# Patient Record
Sex: MALE | Race: WHITE | NOT HISPANIC OR LATINO
[De-identification: names, ages, dates, MRNs, and addresses within clinical notes are randomized per-mention and may not be internally consistent; named-entity substitution may affect disease eponyms.]

---

## 2017-07-26 ENCOUNTER — APPOINTMENT (OUTPATIENT)
Dept: POPULATION HEALTH | Facility: CLINIC | Age: 42
End: 2017-07-26

## 2017-07-26 VITALS
OXYGEN SATURATION: 100 % | DIASTOLIC BLOOD PRESSURE: 84 MMHG | SYSTOLIC BLOOD PRESSURE: 118 MMHG | RESPIRATION RATE: 16 BRPM | BODY MASS INDEX: 19.64 KG/M2 | WEIGHT: 153 LBS | HEIGHT: 74 IN | TEMPERATURE: 97.5 F | HEART RATE: 88 BPM

## 2017-07-26 DIAGNOSIS — Z80.0 FAMILY HISTORY OF MALIGNANT NEOPLASM OF DIGESTIVE ORGANS: ICD-10-CM

## 2017-07-26 DIAGNOSIS — Z87.898 PERSONAL HISTORY OF OTHER SPECIFIED CONDITIONS: ICD-10-CM

## 2017-07-26 DIAGNOSIS — Z87.891 PERSONAL HISTORY OF NICOTINE DEPENDENCE: ICD-10-CM

## 2017-07-26 RX ORDER — INSULIN GLARGINE 100 [IU]/ML
100 INJECTION, SOLUTION SUBCUTANEOUS
Refills: 0 | Status: DISCONTINUED | COMMUNITY

## 2017-08-23 ENCOUNTER — APPOINTMENT (OUTPATIENT)
Dept: POPULATION HEALTH | Facility: CLINIC | Age: 42
End: 2017-08-23
Payer: OTHER MISCELLANEOUS

## 2017-08-23 VITALS
OXYGEN SATURATION: 100 % | HEIGHT: 74 IN | TEMPERATURE: 97.4 F | DIASTOLIC BLOOD PRESSURE: 80 MMHG | WEIGHT: 155 LBS | SYSTOLIC BLOOD PRESSURE: 122 MMHG | BODY MASS INDEX: 19.89 KG/M2 | HEART RATE: 78 BPM | RESPIRATION RATE: 16 BRPM

## 2017-08-23 PROCEDURE — 99213 OFFICE O/P EST LOW 20 MIN: CPT

## 2017-10-04 ENCOUNTER — APPOINTMENT (OUTPATIENT)
Dept: POPULATION HEALTH | Facility: CLINIC | Age: 42
End: 2017-10-04

## 2018-08-27 ENCOUNTER — RX RENEWAL (OUTPATIENT)
Age: 43
End: 2018-08-27

## 2018-09-07 ENCOUNTER — RECORD ABSTRACTING (OUTPATIENT)
Age: 43
End: 2018-09-07

## 2018-09-07 DIAGNOSIS — Z86.39 PERSONAL HISTORY OF OTHER ENDOCRINE, NUTRITIONAL AND METABOLIC DISEASE: ICD-10-CM

## 2018-09-07 DIAGNOSIS — Z52.4 KIDNEY DONOR: ICD-10-CM

## 2018-09-07 DIAGNOSIS — I48.0 PAROXYSMAL ATRIAL FIBRILLATION: ICD-10-CM

## 2018-09-07 LAB
HBA1C MFR BLD HPLC: 7
HBA1C MFR BLD: 7
HBA1C MFR BLD: 7
PODIATRY EVAL: NORMAL

## 2018-09-07 RX ORDER — ASPIRIN 81 MG
81 TABLET, DELAYED RELEASE (ENTERIC COATED) ORAL DAILY
Refills: 0 | Status: ACTIVE | COMMUNITY

## 2018-09-07 RX ORDER — ISOPROPYL ALCOHOL 70 %
TOWELETTE (EA) MISCELLANEOUS
Refills: 0 | Status: ACTIVE | COMMUNITY

## 2018-09-14 ENCOUNTER — APPOINTMENT (OUTPATIENT)
Dept: ENDOCRINOLOGY | Facility: CLINIC | Age: 43
End: 2018-09-14
Payer: COMMERCIAL

## 2018-09-14 VITALS
WEIGHT: 155 LBS | HEIGHT: 74 IN | HEART RATE: 99 BPM | SYSTOLIC BLOOD PRESSURE: 128 MMHG | BODY MASS INDEX: 19.89 KG/M2 | DIASTOLIC BLOOD PRESSURE: 80 MMHG

## 2018-09-14 DIAGNOSIS — G89.29 DORSALGIA, UNSPECIFIED: ICD-10-CM

## 2018-09-14 DIAGNOSIS — M54.9 DORSALGIA, UNSPECIFIED: ICD-10-CM

## 2018-09-14 LAB — GLUCOSE BLDC GLUCOMTR-MCNC: 165

## 2018-09-14 PROCEDURE — 82962 GLUCOSE BLOOD TEST: CPT

## 2018-09-14 PROCEDURE — 99214 OFFICE O/P EST MOD 30 MIN: CPT | Mod: 25

## 2018-09-14 RX ORDER — INSULIN GLARGINE 100 [IU]/ML
100 INJECTION, SOLUTION SUBCUTANEOUS
Refills: 0 | Status: DISCONTINUED | COMMUNITY
End: 2018-09-14

## 2018-09-14 RX ORDER — ASPIRIN 81 MG/1
81 TABLET, COATED ORAL
Refills: 0 | Status: DISCONTINUED | COMMUNITY
End: 2018-09-14

## 2018-09-14 RX ORDER — INSULIN ASPART 100 [IU]/ML
100 INJECTION, SOLUTION INTRAVENOUS; SUBCUTANEOUS
Refills: 0 | Status: DISCONTINUED | COMMUNITY
End: 2018-09-14

## 2018-09-14 RX ORDER — INSULIN GLARGINE 100 [IU]/ML
INJECTION, SOLUTION SUBCUTANEOUS
Refills: 0 | Status: DISCONTINUED | COMMUNITY
End: 2018-09-14

## 2018-09-14 RX ORDER — INSULIN LISPRO 200 [IU]/ML
200 INJECTION, SOLUTION SUBCUTANEOUS
Refills: 0 | Status: DISCONTINUED | COMMUNITY
End: 2018-09-14

## 2018-09-17 ENCOUNTER — MEDICATION RENEWAL (OUTPATIENT)
Age: 43
End: 2018-09-17

## 2018-09-17 RX ORDER — INSULIN ASPART 100 [IU]/ML
100 INJECTION, SOLUTION INTRAVENOUS; SUBCUTANEOUS
Qty: 2 | Refills: 1 | Status: DISCONTINUED | COMMUNITY
Start: 2018-09-14 | End: 2018-09-17

## 2018-10-17 ENCOUNTER — RX RENEWAL (OUTPATIENT)
Age: 43
End: 2018-10-17

## 2018-10-18 ENCOUNTER — APPOINTMENT (OUTPATIENT)
Dept: ENDOCRINOLOGY | Facility: CLINIC | Age: 43
End: 2018-10-18

## 2018-11-15 ENCOUNTER — APPOINTMENT (OUTPATIENT)
Dept: ENDOCRINOLOGY | Facility: CLINIC | Age: 43
End: 2018-11-15
Payer: COMMERCIAL

## 2018-11-15 PROCEDURE — 76536 US EXAM OF HEAD AND NECK: CPT

## 2019-01-15 ENCOUNTER — RECORD ABSTRACTING (OUTPATIENT)
Age: 44
End: 2019-01-15

## 2019-01-23 ENCOUNTER — APPOINTMENT (OUTPATIENT)
Dept: ENDOCRINOLOGY | Facility: CLINIC | Age: 44
End: 2019-01-23
Payer: COMMERCIAL

## 2019-01-23 VITALS
HEIGHT: 74 IN | BODY MASS INDEX: 20.15 KG/M2 | SYSTOLIC BLOOD PRESSURE: 120 MMHG | HEART RATE: 96 BPM | WEIGHT: 157 LBS | DIASTOLIC BLOOD PRESSURE: 80 MMHG

## 2019-01-23 LAB — GLUCOSE BLDC GLUCOMTR-MCNC: 86

## 2019-01-23 PROCEDURE — 82962 GLUCOSE BLOOD TEST: CPT

## 2019-01-23 PROCEDURE — 99214 OFFICE O/P EST MOD 30 MIN: CPT | Mod: 25

## 2019-01-23 RX ORDER — FLASH GLUCOSE SENSOR
KIT MISCELLANEOUS
Qty: 9 | Refills: 1 | Status: DISCONTINUED | COMMUNITY
End: 2019-01-23

## 2019-01-23 RX ORDER — FLASH GLUCOSE SENSOR
KIT MISCELLANEOUS
Qty: 1 | Refills: 0 | Status: DISCONTINUED | COMMUNITY
Start: 2018-09-14 | End: 2019-01-23

## 2019-01-23 RX ORDER — FLASH GLUCOSE SENSOR
KIT MISCELLANEOUS
Qty: 1 | Refills: 0 | Status: DISCONTINUED | COMMUNITY
End: 2019-01-23

## 2019-01-23 NOTE — DATA REVIEWED
[FreeTextEntry1] : Thyroid US  11/15/2018:\par In the right lower pole there is a  hypoechoic nodule. It measures 0.7 x 0.5 x 0.6 cm. (stable)\par In the left lower pole there is a  solid. It measures 0.7 x 0.4 x 0.5 cm. Isoechoic nodule (stable)\par \par \par LABS:\par 1/18/2019:\par Na 131 (stable)\par A1c 7.5%

## 2019-01-23 NOTE — HISTORY OF PRESENT ILLNESS
[FreeTextEntry1] : Patient is seen today for a routine follow up of Type 1 DM, hyponatremia and thyroid nodule.  Has surgery planned for later this week.  Pre-op work up showed hyperglycemia with a Na of 128  (baseline Na level usually around 131).\par \par Quality:  Type 1 DM\par Severity: moderate  (C-peptide still detectable, but low)\par Duration of diabetes:  since 2011\par Associated Complications/ Symptoms:  no known microvascular complications\par Modifying Factors:  Better with insulin\par \par Patient tests blood glucose 3-5 times per day.    Reviewed glucometer and control is improving in the last few weeks with dietary change.   7 day average is 138 mg/dl, 30 day BG average  is 179 mg/dl.    Insurance is not covering Freestyle eunice.  \par \par Current Diabetic Medication Regimen:\par Lantus 12 units qAM\par Humalog with meals using IC ratio of 1:15 and ISF of 1:50.  \par \par Labs are consistent with SIADH.  Hyponatremia has improved somewhat after decrease in beer intake, but has persisted.  CT head was normal in the past.  CXR was unremarkable.  Cortisol and TFTs have been normal.  \par  \par Has injury to right shoulder and biceps tear and will need surgery this Friday.  \par

## 2019-01-23 NOTE — REVIEW OF SYSTEMS
[Fatigue] : fatigue [Back Pain] : back pain [Polydipsia] : polydipsia [Chest Pain] : no chest pain [Shortness Of Breath] : no shortness of breath [Nausea] : no nausea [Headache] : no headaches

## 2019-01-23 NOTE — ASSESSMENT
[FreeTextEntry1] : 43 year old male with Type 1 DM, thyroid nodule and chronic hyponatremia due to SIADH of unclear etiology.    His hyponatremia is stable and his glycemic control is improving.   Safe to proceed with planned surgery at this time from endocrinology standpoint.  \par \par 1.  T1DM-   Continue current insulin doses.  \par 2.  Thyroid nodules-  stable, repeat US in 1-2 years.  \par 2.  Hyponatremia-  stable, continue to increase solute intake and limit free water.

## 2019-01-23 NOTE — CONSULT LETTER
[Dear  ___] : Dear  [unfilled], [Consult Letter:] : I had the pleasure of evaluating your patient, [unfilled]. [Please see my note below.] : Please see my note below. [Consult Closing:] : Thank you very much for allowing me to participate in the care of this patient.  If you have any questions, please do not hesitate to contact me. [Sincerely,] : Sincerely, [FreeTextEntry3] : Xavier Finley MD, FACE\par

## 2019-06-24 ENCOUNTER — APPOINTMENT (OUTPATIENT)
Dept: ENDOCRINOLOGY | Facility: CLINIC | Age: 44
End: 2019-06-24

## 2019-06-24 LAB
GLUCOSE SERPL-MCNC: 126
HBA1C MFR BLD HPLC: 7.5

## 2019-07-24 ENCOUNTER — APPOINTMENT (OUTPATIENT)
Dept: ENDOCRINOLOGY | Facility: CLINIC | Age: 44
End: 2019-07-24
Payer: COMMERCIAL

## 2019-07-24 VITALS
WEIGHT: 151 LBS | DIASTOLIC BLOOD PRESSURE: 70 MMHG | HEART RATE: 95 BPM | SYSTOLIC BLOOD PRESSURE: 110 MMHG | OXYGEN SATURATION: 98 % | BODY MASS INDEX: 19.38 KG/M2 | HEIGHT: 74 IN

## 2019-07-24 LAB — GLUCOSE BLDC GLUCOMTR-MCNC: 119

## 2019-07-24 PROCEDURE — 82962 GLUCOSE BLOOD TEST: CPT

## 2019-07-24 PROCEDURE — 36415 COLL VENOUS BLD VENIPUNCTURE: CPT

## 2019-07-24 PROCEDURE — 99214 OFFICE O/P EST MOD 30 MIN: CPT | Mod: 25

## 2019-07-24 RX ORDER — FLASH GLUCOSE SENSOR
KIT MISCELLANEOUS
Qty: 3 | Refills: 5 | Status: DISCONTINUED | COMMUNITY
Start: 2018-09-14 | End: 2019-07-24

## 2019-07-24 NOTE — PHYSICAL EXAM
[Well Nourished] : well nourished [Well Developed] : well developed [No Acute Distress] : no acute distress [No Neck Mass] : no neck mass was observed [Normal Sclera/Conjunctiva] : normal sclera/conjunctiva [No Proptosis] : no proptosis [No LAD] : no lymphadenopathy [No Thyroid Nodules] : there were no palpable thyroid nodules [Thyroid Not Enlarged] : the thyroid was not enlarged [Normal Rate and Effort] : normal respiratory rhythm and effort [Clear to Auscultation] : lungs were clear to auscultation bilaterally [Normal Rate] : heart rate was normal  [Normal S1, S2] : normal S1 and S2 [Murmurs] : no murmurs [No Edema] : there was no peripheral edema [Regular Rhythm] : with a regular rhythm [Normal Gait] : normal gait [Normal Insight/Judgement] : insight and judgment were intact [Normal Affect] : the affect was normal [Normal Mood] : the mood was normal [Acanthosis Nigricans] : no acanthosis nigricans

## 2019-07-24 NOTE — ASSESSMENT
[FreeTextEntry1] : 43 year old male with Type 1 DM, thyroid nodule and chronic hyponatremia due to SIADH of unclear etiology.    \par \par 1.  T1DM-   Be more consistent with humalog dosing.  Check A1c now.  \par 2.  Thyroid nodules-  stable, repeat US in 1-2 years.  \par 2.  Hyponatremia-   continue to increase solute intake and limit free water.    Check labs now.

## 2019-07-25 LAB
25(OH)D3 SERPL-MCNC: 32.6 NG/ML
ALBUMIN SERPL ELPH-MCNC: 4.9 G/DL
ALP BLD-CCNC: 52 U/L
ALT SERPL-CCNC: 12 U/L
ANION GAP SERPL CALC-SCNC: 15 MMOL/L
AST SERPL-CCNC: 23 U/L
BASOPHILS # BLD AUTO: 0.06 K/UL
BASOPHILS NFR BLD AUTO: 1 %
BILIRUB SERPL-MCNC: 2 MG/DL
BUN SERPL-MCNC: 9 MG/DL
CALCIUM SERPL-MCNC: 9.7 MG/DL
CHLORIDE SERPL-SCNC: 93 MMOL/L
CHOLEST SERPL-MCNC: 179 MG/DL
CHOLEST/HDLC SERPL: 3 RATIO
CO2 SERPL-SCNC: 24 MMOL/L
CREAT SERPL-MCNC: 1.08 MG/DL
CREAT SPEC-SCNC: 44 MG/DL
EOSINOPHIL # BLD AUTO: 0.11 K/UL
EOSINOPHIL NFR BLD AUTO: 1.8 %
ESTIMATED AVERAGE GLUCOSE: 137 MG/DL
GLUCOSE SERPL-MCNC: 127 MG/DL
HBA1C MFR BLD HPLC: 6.4 %
HCT VFR BLD CALC: 42.4 %
HDLC SERPL-MCNC: 60 MG/DL
HGB BLD-MCNC: 14.5 G/DL
IMM GRANULOCYTES NFR BLD AUTO: 0.3 %
LDLC SERPL CALC-MCNC: 108 MG/DL
LYMPHOCYTES # BLD AUTO: 1.63 K/UL
LYMPHOCYTES NFR BLD AUTO: 26.7 %
MAN DIFF?: NORMAL
MCHC RBC-ENTMCNC: 30.5 PG
MCHC RBC-ENTMCNC: 34.2 GM/DL
MCV RBC AUTO: 89.3 FL
MICROALBUMIN 24H UR DL<=1MG/L-MCNC: <1.2 MG/DL
MICROALBUMIN/CREAT 24H UR-RTO: NORMAL MG/G
MONOCYTES # BLD AUTO: 0.71 K/UL
MONOCYTES NFR BLD AUTO: 11.6 %
NEUTROPHILS # BLD AUTO: 3.57 K/UL
NEUTROPHILS NFR BLD AUTO: 58.6 %
PLATELET # BLD AUTO: 328 K/UL
POTASSIUM SERPL-SCNC: 4.6 MMOL/L
PROT SERPL-MCNC: 7.2 G/DL
RBC # BLD: 4.75 M/UL
RBC # FLD: 12.7 %
SODIUM SERPL-SCNC: 132 MMOL/L
TRIGL SERPL-MCNC: 56 MG/DL
TSH SERPL-ACNC: 0.73 UIU/ML
WBC # FLD AUTO: 6.1 K/UL

## 2019-09-05 ENCOUNTER — MEDICATION RENEWAL (OUTPATIENT)
Age: 44
End: 2019-09-05

## 2019-12-27 ENCOUNTER — RX RENEWAL (OUTPATIENT)
Age: 44
End: 2019-12-27

## 2020-01-22 ENCOUNTER — RESULT CHARGE (OUTPATIENT)
Age: 45
End: 2020-01-22

## 2020-01-22 ENCOUNTER — APPOINTMENT (OUTPATIENT)
Dept: ENDOCRINOLOGY | Facility: CLINIC | Age: 45
End: 2020-01-22
Payer: COMMERCIAL

## 2020-01-22 VITALS
HEART RATE: 78 BPM | SYSTOLIC BLOOD PRESSURE: 118 MMHG | BODY MASS INDEX: 20.41 KG/M2 | WEIGHT: 159 LBS | HEIGHT: 74 IN | DIASTOLIC BLOOD PRESSURE: 68 MMHG

## 2020-01-22 LAB — GLUCOSE BLDC GLUCOMTR-MCNC: 137

## 2020-01-22 PROCEDURE — 82962 GLUCOSE BLOOD TEST: CPT

## 2020-01-22 PROCEDURE — 99214 OFFICE O/P EST MOD 30 MIN: CPT | Mod: 25

## 2020-01-22 NOTE — REVIEW OF SYSTEMS
[Neck Pain] : neck pain [Palpitations] : palpitations [Constipation] : constipation [Polyuria] : polyuria [Headache] : headaches [Cold Intolerance] : cold intolerant [Fatigue] : no fatigue [Decreased Appetite] : appetite not decreased [Recent Weight Loss (___ Lbs)] : no recent weight loss [Visual Field Defect] : no visual field defect [Recent Weight Gain (___ Lbs)] : no recent weight gain [Dysphagia] : no dysphagia [Chest Pain] : no chest pain [Blurry Vision] : no blurred vision [Diarrhea] : no diarrhea [Tremors] : no tremors [Dysuria] : no dysuria [Depression] : no depression [Anxiety] : no anxiety [Polydipsia] : no polydipsia [Heat Intolerance] : heat tolerant [Easy Bruising] : no tendency for easy bruising [Swelling] : no swelling [FreeTextEntry4] : r/t injury to disc [FreeTextEntry7] : sometimes  [FreeTextEntry5] : once in awhile, needs to find a new cardiologist  [de-identified] : once in awhile

## 2020-01-22 NOTE — REASON FOR VISIT
[Follow-Up: _____] : a [unfilled] follow-up visit [Other: _____] : [unfilled] [FreeTextEntry1] :  Type 1 DM, hyponatremia and thyroid nodule

## 2020-01-22 NOTE — HISTORY OF PRESENT ILLNESS
[FreeTextEntry1] : Labs have been consistent with SIADH. CT head was normal in the past.  CXR was unremarkable.  Cortisol and TFTs have been normal.  \par \par Quality:  Type 1 DM   (+KIM-65)\par Severity: moderate  (C-peptide still detectable, but low)\par Duration of diabetes:  since 2011\par Associated Complications/ Symptoms: neuropathy \par Modifying Factors:  Better with insulin\par \par Current Diabetic Medication Regimen:\par Lantus 12 units qAM\par Humalog with meals- only takes with high carb meals  (uses IC ratio of 1:15 and ISF of 1:50.  - rarely uses Humalog r/t watching his diet \par \par SMBG:  testing at least 4 times a day. \par per meter - 158, 97, 122, 151, 108, 102, 117, 173, 173, 142, 84, 90\par Insurance did not cover Elena in the past.  \par \par exercise: walks 1 mile a day \par \par Last eye exam: 7/2019 (-) DR \par Last foot exam: in office only \par Last flu vaccine: no

## 2020-01-22 NOTE — ASSESSMENT
[FreeTextEntry1] : 44 year old male with Type 1 DM, thyroid nodule and chronic hyponatremia due to SIADH of unclear etiology.    \par \par Plan:\par Type 1 DM- check A1C - blood sugars are variable \par - continue Lantus and Humalog \par - continue self blood sugar monitoring 4 times a day\par Glucose Sensor Necessity: This patient with diabetes performs 4 or more glucose checks per day utilizing a home blood glucose monitor. The patient is treated with insulin via 3 or more injections daily. This patient requires frequent adjustments to their insulin treatment on the basis of therapeutic continuous glucose monitoring results.\par \par Thyroid nodules-  stable, repeat US in 1-2 years.  repeat in 2020\par \par Hyponatremia-  continue to increase solute intake and limit free water.  Check labs now.  \par \par Labs now and follow up visit in 3 months.

## 2020-01-22 NOTE — PHYSICAL EXAM
[Alert] : alert [Well Developed] : well developed [No Acute Distress] : no acute distress [Well Nourished] : well nourished [Supple] : the neck was supple [EOMI] : extra ocular movement intact [Normal Sclera/Conjunctiva] : normal sclera/conjunctiva [No LAD] : no lymphadenopathy [Thyroid Not Enlarged] : the thyroid was not enlarged [Normal Rate and Effort] : normal respiratory rhythm and effort [No Thyroid Nodules] : there were no palpable thyroid nodules [Normal Rate] : heart rate was normal  [No Accessory Muscle Use] : no accessory muscle use [Clear to Auscultation] : lungs were clear to auscultation bilaterally [Normal S1, S2] : normal S1 and S2 [Regular Rhythm] : with a regular rhythm [Pedal Pulses Normal] : the pedal pulses are present [No Edema] : there was no peripheral edema [Normal Bowel Sounds] : normal bowel sounds [Not Tender] : non-tender [Normal Gait] : normal gait [Soft] : abdomen soft [Acanthosis Nigricans] : no acanthosis nigricans [No Rash] : no rash [Foot Ulcers] : no foot ulcers [Left Foot Was Examined] : left foot ~C was examined [Right Foot Was Examined] : right foot ~C was examined [Normal] : normal [#1 Diminished] : number 1 was diminished [#3 Diminished] : number 3 was diminished [#4 Diminished] : number 4 was diminished [#2 Diminished] : number 2 was diminished [#7 Diminished] : number 7 was normal [#5 Diminished] : number 5 was diminished [#6 Diminished] : number 6 was diminished [#8 Diminished] : number 8 was normal [No Motor Deficits] : the motor exam was normal [#9 Diminished] : number 9 was normal [Normal Insight/Judgement] : insight and judgment were intact [Oriented x3] : oriented to person, place, and time [No Tremors] : no tremors [Normal Mood] : the mood was normal

## 2020-01-26 ENCOUNTER — RESULT REVIEW (OUTPATIENT)
Age: 45
End: 2020-01-26

## 2020-01-26 LAB
25(OH)D3 SERPL-MCNC: 13.6 NG/ML
ALBUMIN SERPL ELPH-MCNC: 4.7 G/DL
ALP BLD-CCNC: 46 U/L
ALT SERPL-CCNC: 12 U/L
ANION GAP SERPL CALC-SCNC: 12 MMOL/L
AST SERPL-CCNC: 16 U/L
BASOPHILS # BLD AUTO: 0.04 K/UL
BASOPHILS NFR BLD AUTO: 0.8 %
BILIRUB SERPL-MCNC: 1.1 MG/DL
BUN SERPL-MCNC: 15 MG/DL
CALCIUM SERPL-MCNC: 9.2 MG/DL
CHLORIDE SERPL-SCNC: 95 MMOL/L
CHOLEST SERPL-MCNC: 192 MG/DL
CHOLEST/HDLC SERPL: 3.4 RATIO
CO2 SERPL-SCNC: 25 MMOL/L
CREAT SERPL-MCNC: 1.01 MG/DL
CREAT SPEC-SCNC: 75 MG/DL
EOSINOPHIL # BLD AUTO: 0.15 K/UL
EOSINOPHIL NFR BLD AUTO: 2.9 %
ESTIMATED AVERAGE GLUCOSE: 137 MG/DL
GLUCOSE SERPL-MCNC: 159 MG/DL
HBA1C MFR BLD HPLC: 6.4 %
HCT VFR BLD CALC: 41.9 %
HDLC SERPL-MCNC: 56 MG/DL
HGB BLD-MCNC: 13.9 G/DL
IMM GRANULOCYTES NFR BLD AUTO: 0.4 %
LDLC SERPL CALC-MCNC: 118 MG/DL
LYMPHOCYTES # BLD AUTO: 1.19 K/UL
LYMPHOCYTES NFR BLD AUTO: 23.1 %
MAN DIFF?: NORMAL
MCHC RBC-ENTMCNC: 30.1 PG
MCHC RBC-ENTMCNC: 33.2 GM/DL
MCV RBC AUTO: 90.7 FL
MICROALBUMIN 24H UR DL<=1MG/L-MCNC: <1.2 MG/DL
MICROALBUMIN/CREAT 24H UR-RTO: NORMAL MG/G
MONOCYTES # BLD AUTO: 0.71 K/UL
MONOCYTES NFR BLD AUTO: 13.8 %
NEUTROPHILS # BLD AUTO: 3.04 K/UL
NEUTROPHILS NFR BLD AUTO: 59 %
PLATELET # BLD AUTO: 323 K/UL
POTASSIUM SERPL-SCNC: 5.4 MMOL/L
PROT SERPL-MCNC: 7.1 G/DL
RBC # BLD: 4.62 M/UL
RBC # FLD: 12.6 %
SODIUM SERPL-SCNC: 132 MMOL/L
TRIGL SERPL-MCNC: 88 MG/DL
TSH SERPL-ACNC: 0.69 UIU/ML
VIT B12 SERPL-MCNC: 1201 PG/ML
WBC # FLD AUTO: 5.15 K/UL

## 2020-01-28 ENCOUNTER — APPOINTMENT (OUTPATIENT)
Dept: ENDOCRINOLOGY | Facility: CLINIC | Age: 45
End: 2020-01-28
Payer: COMMERCIAL

## 2020-01-28 PROCEDURE — 36415 COLL VENOUS BLD VENIPUNCTURE: CPT

## 2020-01-31 LAB
ALBUMIN SERPL ELPH-MCNC: 4.6 G/DL
ALP BLD-CCNC: 49 U/L
ALT SERPL-CCNC: 13 U/L
ANION GAP SERPL CALC-SCNC: 13 MMOL/L
AST SERPL-CCNC: 17 U/L
BILIRUB SERPL-MCNC: 1.6 MG/DL
BUN SERPL-MCNC: 12 MG/DL
CALCIUM SERPL-MCNC: 9.6 MG/DL
CHLORIDE SERPL-SCNC: 93 MMOL/L
CO2 SERPL-SCNC: 25 MMOL/L
CREAT SERPL-MCNC: 1.15 MG/DL
GLUCOSE SERPL-MCNC: 197 MG/DL
POTASSIUM SERPL-SCNC: 5.5 MMOL/L
PROT SERPL-MCNC: 7.1 G/DL
SODIUM SERPL-SCNC: 131 MMOL/L

## 2020-03-02 NOTE — HISTORY OF PRESENT ILLNESS
[FreeTextEntry1] : Patient is seen today for a routine follow up of Type 1 DM, hyponatremia and thyroid nodule.  Labs are consistent with SIADH.  Hyponatremia has improved somewhat after decrease in beer intake, but has persisted.  CT head was normal in the past.  CXR was unremarkable.  Cortisol and TFTs have been normal.  \par \par Quality:  Type 1 DM   (+KIM-65)\par Severity: moderate  (C-peptide still detectable, but low)\par Duration of diabetes:  since 2011\par Associated Complications/ Symptoms:  no known microvascular complications\par Modifying Factors:  Better with insulin\par \par Current Diabetic Medication Regimen:\par Lantus 12 units qAM\par Humalog with meals- only takes with high carb meals  (uses IC ratio of 1:15 and ISF of 1:50.  \par \par SMBG:  testing at least 4 times a day.  \par Insurance did not cover Elena in the past.  \par  none

## 2020-04-20 RX ORDER — BLOOD-GLUCOSE METER
W/DEVICE EACH MISCELLANEOUS
Qty: 1 | Refills: 0 | Status: ACTIVE | COMMUNITY
Start: 2020-04-20 | End: 1900-01-01

## 2020-04-20 RX ORDER — LANCETS 33 GAUGE
EACH MISCELLANEOUS
Qty: 4 | Refills: 1 | Status: ACTIVE | COMMUNITY
Start: 2020-01-22 | End: 1900-01-01

## 2020-04-22 ENCOUNTER — APPOINTMENT (OUTPATIENT)
Dept: ENDOCRINOLOGY | Facility: CLINIC | Age: 45
End: 2020-04-22

## 2020-06-29 ENCOUNTER — APPOINTMENT (OUTPATIENT)
Dept: ENDOCRINOLOGY | Facility: CLINIC | Age: 45
End: 2020-06-29
Payer: COMMERCIAL

## 2020-06-29 PROCEDURE — 99214 OFFICE O/P EST MOD 30 MIN: CPT | Mod: 95

## 2020-06-29 RX ORDER — ERGOCALCIFEROL 1.25 MG/1
1.25 MG CAPSULE, LIQUID FILLED ORAL
Qty: 12 | Refills: 0 | Status: DISCONTINUED | COMMUNITY
Start: 2020-01-24 | End: 2020-06-29

## 2020-06-29 NOTE — REVIEW OF SYSTEMS
[Recent Weight Loss (___ Lbs)] : recent weight loss: [unfilled] lbs [Palpitations] : palpitations [Pain/Numbness of Digits] : pain/numbness of digits [Shortness Of Breath] : no shortness of breath [Abdominal Pain] : no abdominal pain [Nausea] : no nausea

## 2020-06-29 NOTE — DATA REVIEWED
[FreeTextEntry1] : Thyroid US  11/15/2018:\par In the right lower pole there is a  hypoechoic nodule. It measures 0.7 x 0.5 x 0.6 cm. (stable)\par In the left lower pole there is a  solid. It measures 0.7 x 0.4 x 0.5 cm. Isoechoic nodule (stable)\par \par \par

## 2020-06-29 NOTE — HISTORY OF PRESENT ILLNESS
[Home] : at home, [unfilled] , at the time of the visit. [Other Location: e.g. Home (Enter Location, City,State)___] : at [unfilled] [Verbal consent obtained from patient] : the patient, [unfilled] [FreeTextEntry1] : Telehealth visit conducted due to COVID-19 Pandemic.\par Time started:  1:40 PM\par Time Ended:  1:52 PM\par \par Follow up Type 1 DM, hyponatremia and thyroid nodule.  \par Labs are consistent with SIADH.  Hyponatremia has improved somewhat after decrease in beer intake, but has persisted.  CT head was normal in the past.  CXR was unremarkable.  Cortisol and TFTs have been normal.  \par \par Quality:  Type 1 DM   (+KIM-65)\par Severity: moderate  (C-peptide still detectable, but low)\par Duration of diabetes:  since 2011\par Associated Complications/ Symptoms:  no known microvascular complications.  Has some neuropathic pain, likely related to back injury rather than DM as his control has been excellent.  \par Modifying Factors:  Better with insulin\par \par Current Diabetic Medication Regimen:\par Lantus 12 units qAM-  has been skipping lately due to lower blood glucose).  \par Humalog with meal-  not really taking lately. (uses IC ratio of 1:15 and ISF of 1:50) \par \par SMBG:  testing at least 4 times a day.    \par Insurance did not cover Freestyle Elena in the past, but insurance now changed.  \par Most BG in the 80- 140 mg/dl range.  \par  \par Has lost some weight.

## 2020-06-29 NOTE — PHYSICAL EXAM
[Healthy Appearance] : healthy appearance [No Acute Distress] : no acute distress [Normal Affect] : the affect was normal [Normal Sclera/Conjunctiva] : normal sclera/conjunctiva [Normal Mood] : the mood was normal [Normal Insight/Judgement] : insight and judgment were intact

## 2020-06-29 NOTE — ASSESSMENT
[FreeTextEntry1] : 44 year old male with Type 1 DM, thyroid nodule and chronic hyponatremia due to SIADH of unclear etiology.    Recent labs also showed mild hyperkalemia.   His diabetes is well controlled on minimal doses of insulin due to continued endogenous insulin production. \par \par 1.  T1DM-   Advised patient not to skip basal insulin entirely due to risk of DKA.  Rather than skipping dose of insulin, use 4 units of Lantus if BG is low normal.  check C-peptide and A1c now.   Will try reordering Freestyle Elena now that he is on new insurance.  \par 2.  Thyroid nodules-   Will need repeat US later this year.  \par 2.  Hyponatremia-   continue to increase solute intake and limit free water intake.  Will recheck AM cortisol level due to recent mild hyperkalemia in setting of low serum Na to rule out AI, although unlikely.  \par 4.  Vitamin D deficiency-  recheck level now.  May need Rx.

## 2020-10-29 ENCOUNTER — TRANSCRIPTION ENCOUNTER (OUTPATIENT)
Age: 45
End: 2020-10-29

## 2020-10-29 ENCOUNTER — APPOINTMENT (OUTPATIENT)
Dept: ENDOCRINOLOGY | Facility: CLINIC | Age: 45
End: 2020-10-29
Payer: COMMERCIAL

## 2020-10-29 VITALS
OXYGEN SATURATION: 97 % | WEIGHT: 150 LBS | RESPIRATION RATE: 16 BRPM | SYSTOLIC BLOOD PRESSURE: 120 MMHG | DIASTOLIC BLOOD PRESSURE: 88 MMHG | HEIGHT: 74 IN | BODY MASS INDEX: 19.25 KG/M2 | TEMPERATURE: 97.2 F | HEART RATE: 84 BPM

## 2020-10-29 PROCEDURE — 99072 ADDL SUPL MATRL&STAF TM PHE: CPT

## 2020-10-29 PROCEDURE — 99214 OFFICE O/P EST MOD 30 MIN: CPT

## 2020-10-29 NOTE — HISTORY OF PRESENT ILLNESS
[FreeTextEntry1] : Follow up Type 1 DM, hyponatremia and thyroid nodule.  \par Labs are consistent with SIADH.  Hyponatremia has improved somewhat after decrease in beer intake, but has persisted.  CT head was normal in the past.  CXR was unremarkable.  Cortisol and TFTs have been normal.  \par \par Quality:  Type 1 DM   (+KIM-65)\par Severity: moderate  (C-peptide still detectable, but low)\par Duration of diabetes:  since 2011\par Associated Complications/ Symptoms:  no known microvascular complications.\par Modifying Factors:  Better with insulin\par \par Current Diabetic Medication Regimen:\par Basaglar 5 units qAM\par Humalog with meals-  typically not using lately as he is following low CHO diet.   (uses IC ratio of 1:15 and ISF of 1:50) \par \par SMBG:  testing at least 4 times a day.    \par Now using Freestyle Elena CGM.\par \par \par

## 2020-10-29 NOTE — REVIEW OF SYSTEMS
[Recent Weight Loss (___ Lbs)] : recent weight loss: [unfilled] lbs [Joint Pain] : joint pain [Back Pain] : back pain [Chest Pain] : no chest pain [Shortness Of Breath] : no shortness of breath [Nausea] : no nausea

## 2020-10-29 NOTE — PHYSICAL EXAM
[Healthy Appearance] : healthy appearance [No Acute Distress] : no acute distress [No Neck Mass] : no neck mass was observed [No LAD] : no lymphadenopathy [Supple] : the neck was supple [Thyroid Not Enlarged] : the thyroid was not enlarged [No Thyroid Nodules] : no palpable thyroid nodules [No Respiratory Distress] : no respiratory distress [Clear to Auscultation] : lungs were clear to auscultation bilaterally [Normal S1, S2] : normal S1 and S2 [No Murmurs] : no murmurs [Normal Rate] : heart rate was normal [Regular Rhythm] : with a regular rhythm [Normal Affect] : the affect was normal [Normal Insight/Judgement] : insight and judgment were intact [Normal Mood] : the mood was normal [Normal Sclera/Conjunctiva] : normal sclera/conjunctiva [No Lid Lag] : no lid lag [Acanthosis Nigricans] : no acanthosis nigricans

## 2020-10-29 NOTE — ASSESSMENT
[FreeTextEntry1] : 45 year old male with Type 1 DM, vitamin D deficiency, thyroid nodule and chronic hyponatremia due to SIADH of unclear etiology.     \par \par 1.  T1DM-  Check A1c now.  Will recheck C-peptide and pancreatic antibodies given that he appears to be in a prolonged honeymoon phase.   For now continue low dose basal insulin to reduce the risk of DKA.   Patient will attempt to link [employer] for remote download from home and review of CGM.\par 2.  Thyroid nodules-   check US now.   \par 2.  Hyponatremia-   continue to increase solute intake and limit free water intake.  Check Na now.  \par 4.  Vitamin D deficiency-  recheck level now.  May need Rx.

## 2020-11-02 ENCOUNTER — NON-APPOINTMENT (OUTPATIENT)
Age: 45
End: 2020-11-02

## 2020-12-13 ENCOUNTER — TRANSCRIPTION ENCOUNTER (OUTPATIENT)
Age: 45
End: 2020-12-13

## 2021-02-26 ENCOUNTER — APPOINTMENT (OUTPATIENT)
Dept: ENDOCRINOLOGY | Facility: CLINIC | Age: 46
End: 2021-02-26
Payer: COMMERCIAL

## 2021-02-26 DIAGNOSIS — E55.9 VITAMIN D DEFICIENCY, UNSPECIFIED: ICD-10-CM

## 2021-02-26 PROCEDURE — 99442: CPT

## 2021-02-26 NOTE — DATA REVIEWED
[FreeTextEntry1] : Thyroid US  11/15/2018:\par In the right lower pole there is a  hypoechoic nodule. It measures 0.7 x 0.5 x 0.6 cm. (stable)\par In the left lower pole there is a  solid. It measures 0.7 x 0.4 x 0.5 cm. Isoechoic nodule (stable)\par \par

## 2021-02-26 NOTE — HISTORY OF PRESENT ILLNESS
[Home] : at home, [unfilled] , at the time of the visit. [Medical Office: ([facility])___] : at the medical office located in  [FreeTextEntry1] : Telephonic visit conducted due to COVID-19 Pandemic\par Time started:  3:28 PM\par Time Ended:  3:39 PM\par \par Follow up Type 1 DM, hyponatremia and thyroid nodule.  \par Labs are consistent with SIADH.  Hyponatremia has improved somewhat after decrease in beer intake, but has persisted.  CT head was normal in the past.  CXR was unremarkable.  Cortisol and TFTs have been normal.  \par \par Quality:  Type 1 DM   (+KIM-65)\par Severity: moderate  (C-peptide still detectable, but low)\par Duration of diabetes:  since 2011\par Associated Complications/ Symptoms:  no known microvascular complications.\par Modifying Factors:  Better with insulin\par \par Current Diabetic Medication Regimen:\par Basaglar 6 units qPM\par Humalog 2 units with meals   (uses IC ratio of 1:15 and ISF of 1:50) \par \par SMBG:  testing at least 4 times a day.    \par Now using Freestyle Elena CGM.\par Reviewed download:  average BG is 121 mg/dl with 84% of BG within target range, 7% below range and 9% above range.  Having a lot of recent nocturnal hypoglycemia.  \par \par   Reports he has improved diet.  \par \par

## 2021-02-26 NOTE — ASSESSMENT
[FreeTextEntry1] : 45 year old male with Type 1 DM, vitamin D deficiency, thyroid nodule and chronic hyponatremia due to SIADH of unclear etiology.     \par \par 1.  T1DM-   Check labs now.  Decrease Basaglar to 5 units and if any PP hyperglycemia, advised him to increase the Humalog to 3 units with meals.  \par 2.  Thyroid nodules-   check US now.   \par 2.  Hyponatremia-   continue to increase solute intake and limit free water intake.  Check lytes now.  \par 4.  Vitamin D deficiency-  recheck level now.  May need Rx.

## 2021-03-15 ENCOUNTER — NON-APPOINTMENT (OUTPATIENT)
Age: 46
End: 2021-03-15

## 2021-08-26 ENCOUNTER — APPOINTMENT (OUTPATIENT)
Dept: ENDOCRINOLOGY | Facility: CLINIC | Age: 46
End: 2021-08-26
Payer: COMMERCIAL

## 2021-08-26 VITALS
OXYGEN SATURATION: 100 % | BODY MASS INDEX: 19.25 KG/M2 | SYSTOLIC BLOOD PRESSURE: 120 MMHG | DIASTOLIC BLOOD PRESSURE: 80 MMHG | WEIGHT: 150 LBS | HEART RATE: 113 BPM | TEMPERATURE: 97.2 F | HEIGHT: 74 IN | RESPIRATION RATE: 14 BRPM

## 2021-08-26 LAB — GLUCOSE BLDC GLUCOMTR-MCNC: 126

## 2021-08-26 PROCEDURE — 99214 OFFICE O/P EST MOD 30 MIN: CPT | Mod: 25

## 2021-08-26 PROCEDURE — 82962 GLUCOSE BLOOD TEST: CPT

## 2021-08-26 NOTE — HISTORY OF PRESENT ILLNESS
[FreeTextEntry1] : Follow up Type 1 DM, hyponatremia and thyroid nodule.  \par Labs are consistent with SIADH.  Hyponatremia has improved somewhat after decreased beer intake, but has persisted.  CT head was normal in the past.  CXR was unremarkable.  Cortisol and TFTs have been normal.  \par \par Quality:  Type 1 DM   (+KIM-65)\par Severity: moderate  (C-peptide still detectable, but low)\par Duration of diabetes:  since 2011\par Associated Complications/ Symptoms:   + microalbuminuria (8/2021)\par Modifying Factors:  Better with insulin\par \par Current Diabetic Medication Regimen:\par Basaglar 7 units daily \par Humalog 2 units with meals   (uses IC ratio of 1:15 and ISF of 1:50) \par \par SMBG:  testing at least 4 times a day.    \par Now using Freestyle Elena CGM, but recently ran out of sensors.    \par Reports some hypoglycemia when he skips meals.   \par \par \par \par

## 2021-08-26 NOTE — ASSESSMENT
[FreeTextEntry1] : 45 year old male with Type 1 DM, vitamin D deficiency, thyroid nodule and chronic hyponatremia due to SIADH of unclear etiology.    \par \par 1.  T1DM-   Resume CGM.  Eat regularly spaced meals to prevent hypoglycemia.    \par 2.  Thyroid nodules-   needs repeat US.  \par 3.  Hyponatremia-   Worsened. continue to limit free water intake.  Repeat BMP now and consider adding NaCl tabs based on results.    \par  4.  Microalbuminuria-  may not be able to tolerate ACE-I or ARB due to high K levels.  Continue to follow.

## 2021-08-26 NOTE — REVIEW OF SYSTEMS
[Recent Weight Gain (___ Lbs)] : no recent weight gain [Recent Weight Loss (___ Lbs)] : no recent weight loss [Chest Pain] : no chest pain [Shortness Of Breath] : no shortness of breath [Nausea] : no nausea [Back Pain] : back pain

## 2021-08-26 NOTE — PHYSICAL EXAM
[Healthy Appearance] : healthy appearance [No Acute Distress] : no acute distress [Normal Sclera/Conjunctiva] : normal sclera/conjunctiva [No Proptosis] : no proptosis [No LAD] : no lymphadenopathy [No Neck Mass] : no neck mass was observed [Supple] : the neck was supple [Thyroid Not Enlarged] : the thyroid was not enlarged [No Thyroid Nodules] : no palpable thyroid nodules [No Respiratory Distress] : no respiratory distress [Clear to Auscultation] : lungs were clear to auscultation bilaterally [Normal S1, S2] : normal S1 and S2 [No Murmurs] : no murmurs [Normal Rate] : heart rate was normal [Regular Rhythm] : with a regular rhythm [Acanthosis Nigricans] : no acanthosis nigricans [Normal Affect] : the affect was normal [Normal Insight/Judgement] : insight and judgment were intact [Normal Mood] : the mood was normal

## 2021-08-26 NOTE — DATA REVIEWED
[FreeTextEntry1] : Thyroid US  11/15/2018:\par In the right lower pole there is a  hypoechoic nodule. It measures 0.7 x 0.5 x 0.6 cm. (stable)\par In the left lower pole there is a  solid. It measures 0.7 x 0.4 x 0.5 cm. Isoechoic nodule (stable)\par \par \par LABS:\par 8/12/2021:\par Na 126\par \par TSH 0.636\par Urine microalbumin creatinine ratio  91\par C-peptide 0.7\par A1c 5.3%\par Cortisol  13.5\par \par 1/18/2019:\par Na 131 (stable)\par A1c 7.5%

## 2022-01-03 ENCOUNTER — APPOINTMENT (OUTPATIENT)
Dept: ENDOCRINOLOGY | Facility: CLINIC | Age: 47
End: 2022-01-03
Payer: COMMERCIAL

## 2022-01-03 PROCEDURE — 99214 OFFICE O/P EST MOD 30 MIN: CPT | Mod: 95

## 2022-01-03 RX ORDER — GABAPENTIN 100 MG/1
100 CAPSULE ORAL
Refills: 0 | Status: DISCONTINUED | COMMUNITY
End: 2022-01-03

## 2022-01-03 NOTE — HISTORY OF PRESENT ILLNESS
[Home] : at home, [unfilled] , at the time of the visit. [Medical Office: ([facility])___] : at the medical office located in  [Verbal consent obtained from patient] : the patient, [unfilled] [FreeTextEntry1] : Telehealth appointment conducted due to recent spike in COVID-19 cases. \par Time started:  1:30 PM\par Time Ended:  1:49PM\par \par Follow up Type 1 DM, hyponatremia and thyroid nodule.  \par Labs are consistent with SIADH.  Hyponatremia has improved somewhat after decreased beer intake, but has persisted.  CT head was normal in the past.  CXR was unremarkable.  Cortisol and TFTs have been normal.  \par \par Quality:  Type 1 DM   (+KIM-65)\par Severity: moderate  (C-peptide still detectable, but low; 0.7 in 8/2021)\par Duration of diabetes:  since 2011\par Associated Complications/ Symptoms:   + microalbuminuria (8/2021)\par Modifying Factors:  Better with insulin\par \par Current Diabetic Medication Regimen:\par Basaglar 7 units daily \par Humalog 2 units with meals   (uses IC ratio of 1:15 and ISF of 1:50) -  has not been taking rapid acting insulin lately.    \par \par SMBG:  testing at least 4 times a day.    \par Using Freestyle Elena CGM.\par \par Does report occasional hypoglycemia.   \par \par \par

## 2022-01-03 NOTE — ADDENDUM
[FreeTextEntry1] : Reviewed Elena CGM download:\par Average BG is  111 with CV of 34.7%.  84% of values are within target range, 9% below target and 7% above target.    Having frequent nocturnal hypoglycemia. \par \par Advise:\par Reduce Basaglar to 5 units daily.\par Take Humalog with all higher carbohydrate meals (can take 2 units for meals with  > 30 Grams CHO).

## 2022-01-03 NOTE — REVIEW OF SYSTEMS
[Recent Weight Gain (___ Lbs)] : recent weight gain: [unfilled] lbs [Nausea] : no nausea [Abdominal Pain] : no abdominal pain [Headaches] : no headaches

## 2022-01-03 NOTE — ASSESSMENT
[FreeTextEntry1] : 46 year old male with Type 1 DM, vitamin D deficiency, thyroid nodule and chronic hyponatremia due to SIADH of unclear etiology.    \par \par 1.  T1DM-    Will obtain download of CGM for review.  Check A1c now.  Likely needs reduction in basal insulin and reinitiation of prandial insulin.  \par 2.  Thyroid nodules-   needs repeat US.   Script provided to patient.    \par 3.  Hyponatremia- Continue to limit free water intake.  Repeat labs now and consider adding NaCl tabs based on results.    \par  4.  Microalbuminuria-  may not be able to tolerate ACE-I or ARB due to high K levels.   Repeat UACR now.  \par \par Follow up in 4 months

## 2022-05-01 ENCOUNTER — NON-APPOINTMENT (OUTPATIENT)
Age: 47
End: 2022-05-01

## 2022-05-06 ENCOUNTER — RESULT CHARGE (OUTPATIENT)
Age: 47
End: 2022-05-06

## 2022-05-06 ENCOUNTER — APPOINTMENT (OUTPATIENT)
Dept: ENDOCRINOLOGY | Facility: CLINIC | Age: 47
End: 2022-05-06
Payer: COMMERCIAL

## 2022-05-06 VITALS
WEIGHT: 155 LBS | HEART RATE: 78 BPM | BODY MASS INDEX: 19.89 KG/M2 | SYSTOLIC BLOOD PRESSURE: 140 MMHG | DIASTOLIC BLOOD PRESSURE: 100 MMHG | HEIGHT: 74 IN

## 2022-05-06 PROCEDURE — 99214 OFFICE O/P EST MOD 30 MIN: CPT | Mod: 25

## 2022-05-06 PROCEDURE — 82962 GLUCOSE BLOOD TEST: CPT

## 2022-05-06 RX ORDER — INSULIN LISPRO 100 [IU]/ML
100 INJECTION, SOLUTION INTRAVENOUS; SUBCUTANEOUS
Qty: 1 | Refills: 1 | Status: ACTIVE | COMMUNITY
Start: 1900-01-01 | End: 1900-01-01

## 2022-05-06 RX ORDER — FLASH GLUCOSE SENSOR
KIT MISCELLANEOUS
Qty: 6 | Refills: 1 | Status: DISCONTINUED | COMMUNITY
Start: 2020-06-29 | End: 2022-05-06

## 2022-05-11 NOTE — HISTORY OF PRESENT ILLNESS
[FreeTextEntry1] : Follow up Type 1 DM, hyponatremia and thyroid nodule.  \par Labs are consistent with SIADH.  Hyponatremia has improved somewhat after decreased beer intake, but has persisted.  CT head was normal in the past.  CXR was unremarkable.  Cortisol and TFTs have been normal.  \par \par Quality:  Type 1 DM   (+KIM-65)\par Severity: moderate  (C-peptide still detectable, but low; 0.7 in 8/2021)\par Duration of diabetes:  since 2011\par Associated Complications/ Symptoms:   + microalbuminuria (8/2021)\par Modifying Factors:  Better with insulin\par \par Current Diabetic Medication Regimen:\par Basaglar 5 units daily \par Humalog with meals:  uses IC ratio of 1:15 and ISF of 1:50. \par \par SMBG:  testing at least 4 times a day.    \par Has Elena CGM, but ran out of sensors lately.  \par Reports that most BG in the 80- 150 mg/dl range.   \par \par \par

## 2022-05-11 NOTE — REVIEW OF SYSTEMS
[Fatigue] : fatigue [Back Pain] : back pain [Chest Pain] : no chest pain [Shortness Of Breath] : no shortness of breath [Nausea] : no nausea

## 2022-05-11 NOTE — ASSESSMENT
[FreeTextEntry1] : 46 year old male with Type 1 DM, vitamin D deficiency, thyroid nodule and chronic hyponatremia due to SIADH of unclear etiology.    \par \par 1.  T1DM-     Check labs now.  Change CGM to Elena 2 for alerts.  \par 2.  Thyroid nodules-   Check US now.    \par 3.  Hyponatremia- Continue to limit free water intake.  Check Na level.  \par  4.  Microalbuminuria-  Repeat UACR now.  Consider ARB if K level can tolerate.   \par 5.  HTN-  Patient will have BP repeated at home.  Discussed utility of ARB, but patient would like to hold off.  \par \par Follow up in 4 months.

## 2022-05-11 NOTE — DATA REVIEWED
[FreeTextEntry1] : Thyroid US  11/15/2018:\par In the right lower pole there is a  hypoechoic nodule. It measures 0.7 x 0.5 x 0.6 cm. (stable)\par In the left lower pole there is a  solid. It measures 0.7 x 0.4 x 0.5 cm. Isoechoic nodule (stable)\par \par \par LABS:\par POCT glucose 5/6/2022:  83\par \par 8/12/2021:\par Na 126\par \par TSH 0.636\par Urine microalbumin creatinine ratio  91\par C-peptide 0.7\par A1c 5.3%\par Cortisol  13.5\par \par 1/18/2019:\par Na 131 (stable)\par A1c 7.5%

## 2022-09-30 ENCOUNTER — APPOINTMENT (OUTPATIENT)
Dept: ENDOCRINOLOGY | Facility: CLINIC | Age: 47
End: 2022-09-30
Payer: COMMERCIAL

## 2022-09-30 VITALS
TEMPERATURE: 97.2 F | DIASTOLIC BLOOD PRESSURE: 84 MMHG | SYSTOLIC BLOOD PRESSURE: 140 MMHG | BODY MASS INDEX: 20.41 KG/M2 | OXYGEN SATURATION: 97 % | RESPIRATION RATE: 14 BRPM | HEART RATE: 109 BPM | HEIGHT: 74 IN | WEIGHT: 159 LBS

## 2022-09-30 LAB
GLUCOSE BLDC GLUCOMTR-MCNC: 153
HBA1C MFR BLD HPLC: 5.7
LDLC SERPL DIRECT ASSAY-MCNC: 111
TSH SERPL-ACNC: 0.95

## 2022-09-30 PROCEDURE — 95251 CONT GLUC MNTR ANALYSIS I&R: CPT

## 2022-09-30 PROCEDURE — 82962 GLUCOSE BLOOD TEST: CPT

## 2022-09-30 PROCEDURE — 99214 OFFICE O/P EST MOD 30 MIN: CPT | Mod: 25

## 2022-09-30 RX ORDER — METHOCARBAMOL 500 MG/1
500 TABLET, FILM COATED ORAL
Qty: 30 | Refills: 0 | Status: DISCONTINUED | COMMUNITY
Start: 2021-08-30 | End: 2022-09-30

## 2022-09-30 RX ORDER — GABAPENTIN 400 MG/1
400 CAPSULE ORAL
Qty: 120 | Refills: 0 | Status: DISCONTINUED | COMMUNITY
Start: 2021-03-01 | End: 2022-09-30

## 2022-09-30 NOTE — DATA REVIEWED
[FreeTextEntry1] : Thyroid US  11/15/2018:\par In the right lower pole there is a  hypoechoic nodule. It measures 0.7 x 0.5 x 0.6 cm. (stable)\par In the left lower pole there is a  solid. It measures 0.7 x 0.4 x 0.5 cm. Isoechoic nodule (stable)\par \par \par LABS:\par 9/29/2022:\par Na 127\par A1c 5.7%\par \par \par \par 8/12/2021:\par Na 126\par \par TSH 0.636\par Urine microalbumin creatinine ratio  91\par C-peptide 0.7\par A1c 5.3%\par Cortisol  13.5\par \par 1/18/2019:\par Na 131 (stable)\par A1c 7.5%

## 2022-09-30 NOTE — REVIEW OF SYSTEMS
[Fatigue] : fatigue [Chest Pain] : no chest pain [Shortness Of Breath] : no shortness of breath [Nausea] : no nausea

## 2022-09-30 NOTE — HISTORY OF PRESENT ILLNESS
[FreeTextEntry1] : Follow up Type 1 DM, hyponatremia and thyroid nodule.  \par Labs are consistent with SIADH.  Hyponatremia has improved somewhat after decreased beer intake, but has persisted.  CT head was normal in the past.  CXR was unremarkable.  Cortisol and TFTs have been normal.  \par \par Quality:  Type 1 DM   (+KIM-65)\par Severity: moderate  (C-peptide still detectable, but low; 0.7 in 8/2021)\par Duration of diabetes:  since 2011\par Associated Complications/ Symptoms:   + microalbuminuria (8/2021)\par Modifying Factors:  Better with insulin\par \par Current Diabetic Medication Regimen:\par Basaglar 5 units qAM\par Humalog with meals:  uses IC ratio of 1:30  and ISF of 1:50. \par \par SMBG:  testing at least 4 times a day.    \par Currently using Libre2 CGM:  Average BG is 121 mg/dl with CV of 26.7%.  92% of BG within target range, 1 % below target and 7% above target.   \par \par \par

## 2022-09-30 NOTE — ASSESSMENT
[FreeTextEntry1] : 46 year old male with Type 1 DM, vitamin D deficiency, thyroid nodule and chronic hyponatremia due to SIADH of unclear etiology.     His diabetes is well controlled.   \par \par 1.  T1DM-     Continue current insulin doses.   \par 2.  Thyroid nodules-   Check US now.    \par 3.  Hyponatremia- Continue to limit free water intake.  Will add back NCAl 1 gram once a day with careful monitoring of BP.  \par  4.  Microalbuminuria-  follow up on UACR. If elevated, add ARB.  \par   \par Follow up in 6 months.

## 2023-01-17 ENCOUNTER — APPOINTMENT (OUTPATIENT)
Dept: ORTHOPEDIC SURGERY | Facility: CLINIC | Age: 48
End: 2023-01-17
Payer: COMMERCIAL

## 2023-01-17 VITALS — HEIGHT: 74 IN | BODY MASS INDEX: 20.41 KG/M2 | WEIGHT: 159 LBS

## 2023-01-17 DIAGNOSIS — M94.262 CHONDROMALACIA, LEFT KNEE: ICD-10-CM

## 2023-01-17 DIAGNOSIS — M94.261 CHONDROMALACIA, RIGHT KNEE: ICD-10-CM

## 2023-01-17 DIAGNOSIS — M23.91 UNSPECIFIED INTERNAL DERANGEMENT OF RIGHT KNEE: ICD-10-CM

## 2023-01-17 DIAGNOSIS — M23.92 UNSPECIFIED INTERNAL DERANGEMENT OF LEFT KNEE: ICD-10-CM

## 2023-01-17 PROCEDURE — 99204 OFFICE O/P NEW MOD 45 MIN: CPT

## 2023-01-17 PROCEDURE — 73562 X-RAY EXAM OF KNEE 3: CPT | Mod: RT

## 2023-01-17 NOTE — DISCUSSION/SUMMARY
[de-identified] : The patient was informed that his knees are nonsurgical at this time.\par Recommended working on strength and physical therapy - PT script provided today\par Discussed importance of non-impact exercise and muscle stretching before and after exercise. Strengthening exercises shown to the pt.\par follow up prn

## 2023-01-17 NOTE — HISTORY OF PRESENT ILLNESS
[de-identified] : Date of Injury/Onset:      30 years\par Pain: At Rest: 5 /10   \par With Activity: 9 /10 \par Affecting Sleep:YES\par Difficulty with stairs: YES\par Difficulty getting in and out of car: YES\par Sit to stand stiffness:YES\par Mechanism of injury:  catcher in baseball, goalie in hockey\par This is not a Work Related Injury being treated under Worker's Compensation.\par This is not   an athletic injury occurring associated with an interscholastic or organized sports team.\par Quality of symptoms: C/O INTERIOR KNEE PAIN BILATERAL, RIGHT KNEE IS WORSE, SWELLING, HAS INSTABILTY, GIVING WAY, POPPING AND CLICKING\par Improves with:    REST\par Worse with:    SLEEP/WALKING\par Previous Treatment/Imaging/Studies Since Last Visit: HAS BEEN TO ALOT OF PT, USING ADVIL/ALEVE FOR PAIN \par Reports Available For Review Today: NONE\par \par \par

## 2023-01-17 NOTE — PHYSICAL EXAM
[NL (140)] : flexion 140 degrees [NL (0)] : extension 0 degrees [Left] : left knee [Right] : right knee [AP] : anteroposterior [Lateral] : lateral [[place]] : skyline [Mild tricompartmental OA medial narrowing] : Mild tricompartmental OA medial narrowing [5___] : hamstring 5[unfilled]/5 [] : no extensor lag [FreeTextEntry8] : LATERAL PATELLOFEMORAL TENDERNESS [FreeTextEntry9] : 145 degrees flexion and -15 degrees extension  [de-identified] : False

## 2023-01-17 NOTE — DATA REVIEWED
[CT Scan] : CT scan [Right] : of the right [Knee] : knee [Report was reviewed and noted in the chart] : The report was reviewed and noted in the chart [FreeTextEntry1] : Several flaps are present in the cartilage overlying the patella, the largest of which\par measures 3 mm. No evidence for fractures or osteochondral lesions.

## 2023-03-29 LAB
HBA1C MFR BLD HPLC: 5.8
LDLC SERPL CALC-MCNC: 145
MICROALBUMIN/CREAT 24H UR-RTO: 224

## 2023-03-30 ENCOUNTER — RESULT CHARGE (OUTPATIENT)
Age: 48
End: 2023-03-30

## 2023-03-30 ENCOUNTER — APPOINTMENT (OUTPATIENT)
Dept: ENDOCRINOLOGY | Facility: CLINIC | Age: 48
End: 2023-03-30
Payer: COMMERCIAL

## 2023-03-30 VITALS
HEART RATE: 100 BPM | BODY MASS INDEX: 21.2 KG/M2 | DIASTOLIC BLOOD PRESSURE: 82 MMHG | WEIGHT: 160 LBS | OXYGEN SATURATION: 98 % | SYSTOLIC BLOOD PRESSURE: 130 MMHG | HEIGHT: 73 IN

## 2023-03-30 LAB — GLUCOSE BLDC GLUCOMTR-MCNC: 146

## 2023-03-30 PROCEDURE — 82962 GLUCOSE BLOOD TEST: CPT

## 2023-03-30 PROCEDURE — 99214 OFFICE O/P EST MOD 30 MIN: CPT | Mod: 25

## 2023-03-30 RX ORDER — NORMAL SALT TABLETS 1 G/G
1 TABLET ORAL DAILY
Qty: 90 | Refills: 1 | Status: DISCONTINUED | COMMUNITY
Start: 2022-09-30 | End: 2023-03-30

## 2023-03-30 NOTE — DATA REVIEWED
[FreeTextEntry1] : Thyroid US  11/15/2018:\par In the right lower pole there is a  hypoechoic nodule. It measures 0.7 x 0.5 x 0.6 cm. (stable)\par In the left lower pole there is a  solid. It measures 0.7 x 0.4 x 0.5 cm. Isoechoic nodule (stable)\par \par \par LABS:\par 3/14/2022:\par Na 126\par Glucose 136\par C-peptide 1\par \par 9/29/2022:\par Na 127\par A1c 5.7%\par \par \par \par 8/12/2021:\par Na 126\par \par TSH 0.636\par Urine microalbumin creatinine ratio  91\par C-peptide 0.7\par A1c 5.3%\par Cortisol  13.5\par \par 1/18/2019:\par Na 131 (stable)\par A1c 7.5%

## 2023-03-30 NOTE — HISTORY OF PRESENT ILLNESS
[FreeTextEntry1] : Follow up Type 1 DM, hyponatremia and thyroid nodule.  \par History of chronic hyponatremia.  Labs in past were consistent with SIADH.  Patient in the past admitted to excessive intake of beer, which was likely contributing to his hyponatremia.    CT head was normal in the past.  CXR was unremarkable.  Cortisol and TFTs have been normal.  \par \par Quality:  Type 1 DM   (+KIM-65)\par Severity: moderate  (C-peptide still detectable, but low; 0.7 in 8/2021)\par Duration of diabetes:  since 2011\par Associated Complications/ Symptoms:   + microalbuminuria (8/2021)\par Modifying Factors:  Better with insulin\par \par Current Diabetic Medication Regimen:\par Basaglar 5 units qAM\par Humalog with meals-  uses about 2 units with meals  IC ratio of 1:30) \par \par SMBG:  prior to CGM was testing, 4 times a day.    \par Currently using Libre2 CGM.\par \par \par

## 2023-03-30 NOTE — ASSESSMENT
[FreeTextEntry1] : 47 year old male with Type 1 DM with associated albuminuria, HLD,vitamin D deficiency, thyroid nodule and chronic hyponatremia due to SIADH of unclear etiology.     His diabetes is well controlled.   \par \par 1.  T1DM-     Continue low dose insulin therapy.   C-peptide remains detectable, but low, indicating some residual beta- cell function.  Repeat A1c in 6 months.   Continue CGM therapy.  \par 2.  Thyroid nodules-   Check US now.    Has been noncompliant with requests for US in the past.   \par 3.  Hyponatremia-  Recommended evaluation with renal due to persistent hyponatremia.  has been noncompliant with recommendations for fluid restriction.  Warned of risks of hyponatremia.  \par  4.  Microalbuminuria-   add Losartan 25 mg daily.  \par 5.  HLD-  discussed utility of statin therapy for CV protection in DM.  He will discuss this further with his cardiologist next month.  \par   \par Follow up in 6 months.

## 2023-03-30 NOTE — REVIEW OF SYSTEMS
[Headaches] : headaches [Recent Weight Gain (___ Lbs)] : no recent weight gain [Recent Weight Loss (___ Lbs)] : no recent weight loss [Nausea] : no nausea

## 2023-10-05 ENCOUNTER — APPOINTMENT (OUTPATIENT)
Dept: ENDOCRINOLOGY | Facility: CLINIC | Age: 48
End: 2023-10-05
Payer: COMMERCIAL

## 2023-10-05 VITALS
BODY MASS INDEX: 21.27 KG/M2 | OXYGEN SATURATION: 99 % | WEIGHT: 160.5 LBS | HEIGHT: 73 IN | SYSTOLIC BLOOD PRESSURE: 120 MMHG | RESPIRATION RATE: 14 BRPM | TEMPERATURE: 97.6 F | HEART RATE: 82 BPM | DIASTOLIC BLOOD PRESSURE: 80 MMHG

## 2023-10-05 DIAGNOSIS — E78.5 HYPERLIPIDEMIA, UNSPECIFIED: ICD-10-CM

## 2023-10-05 DIAGNOSIS — E87.1 HYPO-OSMOLALITY AND HYPONATREMIA: ICD-10-CM

## 2023-10-05 LAB — GLUCOSE BLDC GLUCOMTR-MCNC: 118

## 2023-10-05 PROCEDURE — 95251 CONT GLUC MNTR ANALYSIS I&R: CPT

## 2023-10-05 PROCEDURE — 82962 GLUCOSE BLOOD TEST: CPT

## 2023-10-05 PROCEDURE — 99214 OFFICE O/P EST MOD 30 MIN: CPT | Mod: 25

## 2023-10-05 RX ORDER — PEN NEEDLE, DIABETIC 29 G X1/2"
31G X 8 MM NEEDLE, DISPOSABLE MISCELLANEOUS
Qty: 600 | Refills: 1 | Status: ACTIVE | COMMUNITY
Start: 1900-01-01 | End: 1900-01-01

## 2023-10-05 RX ORDER — LOSARTAN POTASSIUM 25 MG/1
25 TABLET, FILM COATED ORAL
Qty: 90 | Refills: 1 | Status: ACTIVE | COMMUNITY
Start: 2023-03-30 | End: 1900-01-01

## 2023-10-05 RX ORDER — BLOOD SUGAR DIAGNOSTIC
STRIP MISCELLANEOUS
Qty: 4 | Refills: 0 | Status: ACTIVE | OUTPATIENT
Start: 2019-12-27

## 2024-02-23 ENCOUNTER — APPOINTMENT (OUTPATIENT)
Dept: ENDOCRINOLOGY | Facility: CLINIC | Age: 49
End: 2024-02-23

## 2024-03-04 ENCOUNTER — APPOINTMENT (OUTPATIENT)
Dept: ENDOCRINOLOGY | Facility: CLINIC | Age: 49
End: 2024-03-04
Payer: COMMERCIAL

## 2024-03-04 VITALS
TEMPERATURE: 97.6 F | WEIGHT: 164.19 LBS | SYSTOLIC BLOOD PRESSURE: 120 MMHG | HEART RATE: 80 BPM | DIASTOLIC BLOOD PRESSURE: 80 MMHG | OXYGEN SATURATION: 99 % | BODY MASS INDEX: 21.76 KG/M2 | HEIGHT: 73 IN | RESPIRATION RATE: 14 BRPM

## 2024-03-04 DIAGNOSIS — E04.1 NONTOXIC SINGLE THYROID NODULE: ICD-10-CM

## 2024-03-04 DIAGNOSIS — R80.9 PROTEINURIA, UNSPECIFIED: ICD-10-CM

## 2024-03-04 DIAGNOSIS — E10.65 TYPE 1 DIABETES MELLITUS WITH HYPERGLYCEMIA: ICD-10-CM

## 2024-03-04 LAB
HBA1C MFR BLD HPLC: 6.4
LDLC SERPL DIRECT ASSAY-MCNC: 86
TSH SERPL-ACNC: 0.85

## 2024-03-04 PROCEDURE — 99214 OFFICE O/P EST MOD 30 MIN: CPT

## 2024-03-04 PROCEDURE — 95251 CONT GLUC MNTR ANALYSIS I&R: CPT

## 2024-03-04 NOTE — ASSESSMENT
[FreeTextEntry1] : 48 year old male with Type 1 DM with associated albuminuria, HLD, vitamin D deficiency, thyroid nodule and chronic hyponatremia due to SIADH of unclear etiology.    His diabetes is well controlled.     1.  T1DM-      Due to slight overnight increase in BG, increase Basaglar to 6 units qhs.  2.  Thyroid nodules-  Attempt at FNA of LLP nodule only showed skeletal muscle tissue.  This could mean the biopsy went to deep past the actual thyroid nodule, otr the nodule sampled was actually a pseudonodule, and not a true thyroid nodule.  Offered to repeat FNA, but patient would rather monitor with serial US.  Will order repeat US at next visit.    3.  Hyponatremia-   Continue efforts at fluid restriction.     4.  Microalbuminuria-   Continue Losartan 25 mg daily.   5.  HLD-  Continue Atorvastatin 10 mg daily       Follow up in 4 months.

## 2024-03-04 NOTE — HISTORY OF PRESENT ILLNESS
[FreeTextEntry1] : Follow up Type 1 DM, hyponatremia and thyroid nodule.   History of chronic hyponatremia.  Labs in past were consistent with SIADH.  Patient in the past admitted to excessive intake of beer, which was likely contributing to his hyponatremia.    CT head was normal in the past.  CXR was unremarkable.  Cortisol and TFTs have been normal.    Left lower pole 1.4 cm thyroid nodule enlarged on last US.  FNA was attempted in February, but showed only skeletal muscle.    Quality:  Type 1 DM   (+KIM-65) Severity: moderate  (C-peptide still detectable, but low; 0.7 in 8/2021) Duration of diabetes:  since 2011 Associated Complications/ Symptoms:   + microalbuminuria (8/2021) Modifying Factors:  Better with insulin  Current Diabetic Medication Regimen: Basaglar 5 units qAM Humalog with meals-  uses about 2 units with meals  (IC ratio of 1:30)   SMBG:  prior to CGM was testing, 4 times a day.     Currently using Libre2 CGM. Average BG is 151 mg/dl with CV of 23%.  81% of BG within target range, 0% below range and 19% above range.

## 2024-03-04 NOTE — DATA REVIEWED
[FreeTextEntry1] : Thyorid US 9/20/2023: Right isthmus 0.7 x 0.2 x 0.5 cm nodule left lower pole 1.4 x 0.7 x 1.4 cm hypoechoic nodule (enlarged)  Thyroid US  11/15/2018: In the right lower pole there is a  hypoechoic nodule. It measures 0.7 x 0.5 x 0.6 cm. (stable) In the left lower pole there is a  solid. It measures 0.7 x 0.4 x 0.5 cm. Isoechoic nodule (stable)  LABS: 1/25/2024: Na 126 Glucose 180 A1c 6.4% TSH 0.853  9/18/2023: Na 127  UACR 104 A1c 6.3%  3/14/2022: Na 126 Glucose 136 C-peptide 1  9/29/2022: Na 127 A1c 5.7%    8/12/2021: Na 126  TSH 0.636 Urine microalbumin creatinine ratio  91 C-peptide 0.7 A1c 5.3% Cortisol  13.5  1/18/2019: Na 131 (stable) A1c 7.5%

## 2024-03-29 ENCOUNTER — RX RENEWAL (OUTPATIENT)
Age: 49
End: 2024-03-29

## 2024-03-29 RX ORDER — FLASH GLUCOSE SENSOR
KIT MISCELLANEOUS
Qty: 6 | Refills: 3 | Status: ACTIVE | COMMUNITY
Start: 2022-05-06 | End: 1900-01-01

## 2024-04-05 ENCOUNTER — RX RENEWAL (OUTPATIENT)
Age: 49
End: 2024-04-05

## 2024-04-05 RX ORDER — INSULIN GLARGINE 100 [IU]/ML
100 INJECTION, SOLUTION SUBCUTANEOUS
Qty: 15 | Refills: 1 | Status: ACTIVE | COMMUNITY
Start: 2018-08-27 | End: 1900-01-01

## 2024-04-12 ENCOUNTER — RX RENEWAL (OUTPATIENT)
Age: 49
End: 2024-04-12

## 2024-04-12 RX ORDER — ATORVASTATIN CALCIUM 10 MG/1
10 TABLET, FILM COATED ORAL
Qty: 90 | Refills: 3 | Status: ACTIVE | COMMUNITY
Start: 2023-10-05 | End: 1900-01-01

## 2024-09-08 ENCOUNTER — NON-APPOINTMENT (OUTPATIENT)
Age: 49
End: 2024-09-08

## 2024-09-09 ENCOUNTER — APPOINTMENT (OUTPATIENT)
Dept: ENDOCRINOLOGY | Facility: CLINIC | Age: 49
End: 2024-09-09
Payer: COMMERCIAL

## 2024-09-09 VITALS
WEIGHT: 157.19 LBS | HEART RATE: 80 BPM | SYSTOLIC BLOOD PRESSURE: 130 MMHG | BODY MASS INDEX: 20.83 KG/M2 | RESPIRATION RATE: 14 BRPM | HEIGHT: 73 IN | DIASTOLIC BLOOD PRESSURE: 90 MMHG | OXYGEN SATURATION: 99 % | TEMPERATURE: 98 F

## 2024-09-09 DIAGNOSIS — Z12.5 ENCOUNTER FOR SCREENING FOR MALIGNANT NEOPLASM OF PROSTATE: ICD-10-CM

## 2024-09-09 DIAGNOSIS — R80.9 PROTEINURIA, UNSPECIFIED: ICD-10-CM

## 2024-09-09 DIAGNOSIS — E04.1 NONTOXIC SINGLE THYROID NODULE: ICD-10-CM

## 2024-09-09 DIAGNOSIS — E78.5 HYPERLIPIDEMIA, UNSPECIFIED: ICD-10-CM

## 2024-09-09 DIAGNOSIS — E10.65 TYPE 1 DIABETES MELLITUS WITH HYPERGLYCEMIA: ICD-10-CM

## 2024-09-09 DIAGNOSIS — E87.1 HYPO-OSMOLALITY AND HYPONATREMIA: ICD-10-CM

## 2024-09-09 LAB — GLUCOSE BLDC GLUCOMTR-MCNC: 128

## 2024-09-09 PROCEDURE — 82962 GLUCOSE BLOOD TEST: CPT

## 2024-09-09 PROCEDURE — 99214 OFFICE O/P EST MOD 30 MIN: CPT

## 2024-09-09 PROCEDURE — 95251 CONT GLUC MNTR ANALYSIS I&R: CPT

## 2024-09-09 RX ORDER — BLOOD-GLUCOSE SENSOR
EACH MISCELLANEOUS
Qty: 6 | Refills: 1 | Status: ACTIVE | COMMUNITY
Start: 2024-09-09 | End: 1900-01-01

## 2024-09-09 NOTE — REVIEW OF SYSTEMS
[Recent Weight Loss (___ Lbs)] : recent weight loss: [unfilled] lbs [Headaches] : no headaches [Polydipsia] : no polydipsia

## 2024-09-09 NOTE — HISTORY OF PRESENT ILLNESS
[FreeTextEntry1] : Follow up Type 1 DM, hyponatremia and thyroid nodule.   History of chronic hyponatremia.  Labs in past were consistent with SIADH.  Patient in the past admitted to excessive intake of beer, which was likely contributing to his hyponatremia.    CT head was normal in the past.  CXR was unremarkable.  Cortisol and TFTs have been normal.    Left lower pole 1.4 cm thyroid nodule enlarged on last US.  FNA was attempted in February 2024, but showed only skeletal muscle.    Diagnosed with Type 1 DM   (+KIM-65) in 2011. (C-peptide still detectable, but low; 0.7 in 8/2021) He has associated microalbuminuria.    His glycemic control has improved with insulin therapy.     Current Diabetic Medication Regimen: Basaglar 6 units qAM Humalog 2 units with meals  SMBG:  prior to CGM was testing, 4 times a day.     Currently using Libre2 CGM. Average BG is 151 mg/dl with CV of 30%.  81% of BG within target range, 0% below range and 19% above range.  Having episodes of postrandial hyperglycemia at times.

## 2024-09-09 NOTE — ASSESSMENT
[FreeTextEntry1] : 48 year old male with Type 1 DM with associated albuminuria, HLD, vitamin D deficiency, thyroid nodule and chronic hyponatremia due to SIADH of unclear etiology.    His diabetes is well controlled.     1.  T1DM-    Continue current dose of Basaglar.  For larger meals, try 3 units of Humalog to prevent postprandial hyperglycemia.  Change to Libre3 for realtime CGM.       2.  Thyroid nodules-   Patient does not want to reattempt FNA at this time.  Will repeat US now.     3.  Hyponatremia-   Continue efforts at fluid restriction.  Advised to liberalize sodium intact in diet.   Check Na now.    4.  Microalbuminuria-   Continue Losartan 25 mg daily.   5.  HLD-  Continue Atorvastatin 10 mg daily       Follow up in 6 months.      CGM STATEMENT: This patient with diabetes - Is injecting insulin 3 or more times per day - Is currently on CGM, testing glucose continuously - Has been seen in the office by a provider within the last six months to review CGM data with their provider - Is adjusting multi-dose insulin daily using a sliding scale or correction factor as documented in the medical record CGM is medically necessary for this pt. - CGM will improve/maintain A1c - CGM will reduce hypoglycemic events Elena and Dexcom each require one test strip daily to use in case of sensor failure or for blood glucose verification or during sensor warmup.

## 2025-01-21 ENCOUNTER — TRANSCRIPTION ENCOUNTER (OUTPATIENT)
Age: 50
End: 2025-01-21

## 2025-03-06 ENCOUNTER — NON-APPOINTMENT (OUTPATIENT)
Age: 50
End: 2025-03-06

## 2025-03-11 ENCOUNTER — APPOINTMENT (OUTPATIENT)
Dept: ENDOCRINOLOGY | Facility: CLINIC | Age: 50
End: 2025-03-11
Payer: MEDICARE

## 2025-03-11 VITALS
OXYGEN SATURATION: 99 % | SYSTOLIC BLOOD PRESSURE: 130 MMHG | WEIGHT: 160 LBS | DIASTOLIC BLOOD PRESSURE: 80 MMHG | BODY MASS INDEX: 21.2 KG/M2 | TEMPERATURE: 98 F | HEART RATE: 80 BPM | RESPIRATION RATE: 16 BRPM | HEIGHT: 73 IN

## 2025-03-11 DIAGNOSIS — E87.1 HYPO-OSMOLALITY AND HYPONATREMIA: ICD-10-CM

## 2025-03-11 DIAGNOSIS — E04.1 NONTOXIC SINGLE THYROID NODULE: ICD-10-CM

## 2025-03-11 DIAGNOSIS — R80.9 PROTEINURIA, UNSPECIFIED: ICD-10-CM

## 2025-03-11 DIAGNOSIS — E10.65 TYPE 1 DIABETES MELLITUS WITH HYPERGLYCEMIA: ICD-10-CM

## 2025-03-11 DIAGNOSIS — E78.5 HYPERLIPIDEMIA, UNSPECIFIED: ICD-10-CM

## 2025-03-11 LAB — GLUCOSE BLDC GLUCOMTR-MCNC: 123

## 2025-03-11 PROCEDURE — G2211 COMPLEX E/M VISIT ADD ON: CPT

## 2025-03-11 PROCEDURE — 99204 OFFICE O/P NEW MOD 45 MIN: CPT

## 2025-03-11 PROCEDURE — 82962 GLUCOSE BLOOD TEST: CPT

## 2025-04-11 RX ORDER — PEN NEEDLE, DIABETIC 29 G X1/2"
31G X 8 MM NEEDLE, DISPOSABLE MISCELLANEOUS
Qty: 6 | Refills: 1 | Status: ACTIVE | COMMUNITY
Start: 2025-04-11 | End: 1900-01-01

## 2025-04-15 ENCOUNTER — NON-APPOINTMENT (OUTPATIENT)
Age: 50
End: 2025-04-15

## 2025-09-10 ENCOUNTER — TRANSCRIPTION ENCOUNTER (OUTPATIENT)
Age: 50
End: 2025-09-10

## 2025-09-16 ENCOUNTER — NON-APPOINTMENT (OUTPATIENT)
Age: 50
End: 2025-09-16

## 2025-09-16 ENCOUNTER — TRANSCRIPTION ENCOUNTER (OUTPATIENT)
Age: 50
End: 2025-09-16